# Patient Record
Sex: FEMALE | Race: WHITE | NOT HISPANIC OR LATINO | Employment: UNEMPLOYED | URBAN - METROPOLITAN AREA
[De-identification: names, ages, dates, MRNs, and addresses within clinical notes are randomized per-mention and may not be internally consistent; named-entity substitution may affect disease eponyms.]

---

## 2018-01-07 ENCOUNTER — OFFICE VISIT (OUTPATIENT)
Dept: URGENT CARE | Facility: CLINIC | Age: 28
End: 2018-01-07
Payer: COMMERCIAL

## 2018-01-07 PROCEDURE — G0381 LEV 2 HOSP TYPE B ED VISIT: HCPCS

## 2018-01-08 NOTE — PROGRESS NOTES
Assessment   1  Allergic reaction, initial encounter (995 3) (T78 40XA)    Plan   Allergic reaction, initial encounter    · MethylPREDNISolone Acetate 40 MG/ML Injection Suspension    (DEPO-Medrol); INJECT 1  ML Intramuscular; To Be Done:    [id number]    Discussion/Summary   Discussion Summary:    I recommend rest  He can continue to take the [place] as needed  Follow-up as needed  Medication Side Effects Reviewed: Possible side effects of new medications were reviewed with the patient/guardian today  Counseling Documentation With Imm: The patient was counseled regarding instructions for management,-- impressions,-- importance of compliance with treatment  Chief Complaint   1  Facial Pain  Chief Complaint Free Text Note Form: Patient states having facial swelling more to left side of face than right  Used new facial soap and moisturizer Friday night and noticed redness swelling starting Saturday morning  Feels it is getting worse has been using Benadryl  Denies any other areas of swelling  History of Present Illness   HPI: 64-year-old female in today with her mother  She has some facial wash 2 nights ago and apparently reactive  She complains of swelling around her eyes and face  She did take some Allegra and Benadryl but says it has not really helped  Hospital Based Practices Required Assessment: Reason DV Screen not done: family at bedside       Depression And Suicide Screen  Reason suicide screen not done: family at bedside  Review of Systems   Focused-Female:      Constitutional: as noted in HPI       ENT: as noted in HPI  Cardiovascular: no complaints of slow or fast heart rate, no chest pain, no palpitations, no leg claudication or lower extremity edema  Respiratory: no complaints of shortness of breath, no wheezing, no dyspnea on exertion, no orthopnea or PND        Gastrointestinal: no complaints of abdominal pain, no constipation, no nausea or diarrhea, no vomiting, no bloody stools  Genitourinary: no complaints of dysuria, no incontinence, no pelvic pain, no dysmenorrhea, no vaginal discharge or abnormal vaginal bleeding  Musculoskeletal: no complaints of arthralgia, no myalgia, no joint swelling or stiffness, no limb pain or swelling  Active Problems   1  Swelling of face (784 2) (R22 0)    Past Medical History   1  No pertinent past medical history  Active Problems And Past Medical History Reviewed: The active problems and past medical history were reviewed and updated today  Family History   Family History Reviewed: The family history was reviewed and updated today  Social History    · Former smoker (T52 12) (X59 [id number])  Social History Reviewed: The social history was reviewed and updated today  Surgical History   1  Denied: History Of Prior Surgery  Surgical History Reviewed: The surgical history was reviewed and updated today  Current Meds    1  No Reported Medications Recorded  Medication List Reviewed: The medication list was reviewed and updated today  Allergies   1  Ketek    Vitals   Signs   Recorded: [id number] 08:22AM   Temperature: 97 9 F, Tympanic  Heart Rate: 80  Respiration: 20  Systolic: 875, LUE, Sitting  Diastolic: 72, LUE, Sitting  Height: 5 ft 1 in  Weight: 168 lb 8 oz  BMI Calculated: 31 84  BSA Calculated: 1 76  O2 Saturation: 98, RA  Pain Scale: 0    Physical Exam        Constitutional      General appearance: No acute distress, well appearing and well nourished  Eyes      Conjunctiva and lids: No swelling, erythema or discharge  Pupils and irises: Equal, round and reactive to light  Ears, Nose, Mouth, and Throat      External inspection of ears and nose: Abnormal  -- Swelling and erythema around the face eyes including the ears  No discharge noted  Otoscopic examination: Tympanic membranes translucent with normal light reflex  Canals patent without erythema         Nasal mucosa, septum, and turbinates: Normal without edema or erythema  Oropharynx: Normal with no erythema, edema, exudate or lesions  -- No oral lesions or swelling  Pulmonary      Respiratory effort: No increased work of breathing or signs of respiratory distress  Auscultation of lungs: Clear to auscultation  Cardiovascular      Palpation of heart: Normal PMI, no thrills  Auscultation of heart: Normal rate and rhythm, normal S1 and S2, without murmurs  Examination of extremities for edema and/or varicosities: Normal        Abdomen      Abdomen: Non-tender, no masses  Liver and spleen: No hepatomegaly or splenomegaly  Lymphatic      Palpation of lymph nodes in neck: No lymphadenopathy  Musculoskeletal      Gait and station: Normal        Digits and nails: Normal without clubbing or cyanosis  Inspection/palpation of joints, bones, and muscles: Normal        Skin      Skin and subcutaneous tissue: Normal without rashes or lesions  Neurologic      Cranial nerves: Cranial nerves 2-12 intact  Reflexes: 2+ and symmetric  Sensation: No sensory loss         Psychiatric      Orientation to person, place, and time: Normal        Mood and affect: Normal        Signatures    Electronically signed by : Yamile De DO; Jan 7 2018  8:46AM EST                       (Author)

## 2018-01-23 VITALS
HEART RATE: 80 BPM | SYSTOLIC BLOOD PRESSURE: 117 MMHG | TEMPERATURE: 97.9 F | BODY MASS INDEX: 31.81 KG/M2 | DIASTOLIC BLOOD PRESSURE: 72 MMHG | RESPIRATION RATE: 20 BRPM | OXYGEN SATURATION: 98 % | WEIGHT: 168.5 LBS | HEIGHT: 61 IN

## 2019-01-15 ENCOUNTER — IMPORTED ENCOUNTER (OUTPATIENT)
Dept: URBAN - NONMETROPOLITAN AREA CLINIC 1 | Facility: CLINIC | Age: 29
End: 2019-01-15

## 2019-01-15 PROBLEM — H52.13: Noted: 2019-01-15

## 2019-01-15 PROCEDURE — 92310 CONTACT LENS FITTING OU: CPT

## 2019-01-15 PROCEDURE — S0620 ROUTINE OPHTHALMOLOGICAL EXA: HCPCS

## 2019-01-15 NOTE — PATIENT DISCUSSION
Myopia OUDiscussed refractive status in detail with patient. New glasses and contact Rx given today. Discussed proper care replacement and hygiene. Continue to monitor.

## 2021-05-05 ENCOUNTER — IMPORTED ENCOUNTER (OUTPATIENT)
Dept: URBAN - NONMETROPOLITAN AREA CLINIC 1 | Facility: CLINIC | Age: 31
End: 2021-05-05

## 2021-05-05 PROBLEM — H52.223: Noted: 2021-05-05

## 2021-05-05 PROBLEM — H52.13: Noted: 2019-01-15

## 2021-05-05 PROCEDURE — 92310 CONTACT LENS FITTING OU: CPT

## 2021-05-05 PROCEDURE — 92340 FIT SPECTACLES MONOFOCAL: CPT

## 2021-05-05 PROCEDURE — S0621 ROUTINE OPHTHALMOLOGICAL EXA: HCPCS

## 2021-05-05 NOTE — PATIENT DISCUSSION
Compound Myopic Astigmatism OU-  discussed findings w/patient-  new spectacle/CL Rx issued-  continue to monitor yearly or prn; 's Notes: MR 5/5/2021DFE 5/2/2021

## 2022-04-09 ASSESSMENT — TONOMETRY
OD_IOP_MMHG: 11
OD_IOP_MMHG: 10
OS_IOP_MMHG: 12
OS_IOP_MMHG: 10

## 2022-04-09 ASSESSMENT — KERATOMETRY
OD_K1POWER_DIOPTERS: 44.75
OS_AXISANGLE_DEGREES: 031
OD_AXISANGLE_DEGREES: 144
OD_K2POWER_DIOPTERS: 45.75
OS_K1POWER_DIOPTERS: 45.00
OS_K2POWER_DIOPTERS: 45.75

## 2022-04-09 ASSESSMENT — VISUAL ACUITY
OS_SC: 20/20-
OS_SC: 20/30-
OD_SC: 20/30-
OS_SC: 20/30-
OD_SC: 20/30-
OD_SC: 20/30+2

## 2025-05-08 ENCOUNTER — COMPREHENSIVE EXAM (OUTPATIENT)
Age: 35
End: 2025-05-08

## 2025-05-08 DIAGNOSIS — H52.13: ICD-10-CM

## 2025-05-08 DIAGNOSIS — H52.223: ICD-10-CM

## 2025-05-08 PROCEDURE — 92015 DETERMINE REFRACTIVE STATE: CPT

## 2025-05-08 PROCEDURE — 92310-2 LEVEL 2 SOFT LENS UPDATE

## 2025-05-08 PROCEDURE — 92014 COMPRE OPH EXAM EST PT 1/>: CPT
